# Patient Record
Sex: MALE | Race: BLACK OR AFRICAN AMERICAN
[De-identification: names, ages, dates, MRNs, and addresses within clinical notes are randomized per-mention and may not be internally consistent; named-entity substitution may affect disease eponyms.]

---

## 2021-08-17 ENCOUNTER — HOSPITAL ENCOUNTER (EMERGENCY)
Age: 36
Discharge: HOME | End: 2021-08-17
Payer: COMMERCIAL

## 2021-08-17 VITALS
TEMPERATURE: 98.24 F | DIASTOLIC BLOOD PRESSURE: 104 MMHG | OXYGEN SATURATION: 99 % | RESPIRATION RATE: 18 BRPM | SYSTOLIC BLOOD PRESSURE: 167 MMHG | HEART RATE: 76 BPM
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
## 2021-08-17 DIAGNOSIS — M54.9: Primary | ICD-10-CM

## 2021-08-17 DIAGNOSIS — R10.9: ICD-10-CM

## 2021-08-17 DIAGNOSIS — R11.0: ICD-10-CM

## 2021-08-17 DIAGNOSIS — Z20.822: ICD-10-CM

## 2021-08-17 PROCEDURE — 81015 MICROSCOPIC EXAM OF URINE: CPT

## 2021-08-17 PROCEDURE — 87635 SARS-COV-2 COVID-19 AMP PRB: CPT

## 2021-08-17 PROCEDURE — 81003 URINALYSIS AUTO W/O SCOPE: CPT

## 2021-08-17 PROCEDURE — 99283 EMERGENCY DEPT VISIT LOW MDM: CPT

## 2023-01-13 ENCOUNTER — HOSPITAL ENCOUNTER (OUTPATIENT)
Dept: HOSPITAL 76 - DI | Age: 38
Discharge: HOME | End: 2023-01-13
Attending: PHYSICIAN ASSISTANT
Payer: COMMERCIAL

## 2023-01-13 DIAGNOSIS — S90.112A: Primary | ICD-10-CM

## 2023-01-13 NOTE — XRAY REPORT
PROCEDURE:  Foot 3 View LT

 

INDICATIONS:  CONTUSION OF LEFT GREAT TOE WITHOUT DAMAGE TO NAIL

 

TECHNIQUE:  3 views of the foot were acquired.  

 

COMPARISON:  None

 

FINDINGS:  

 

Bones:  No fractures or dislocations.  No suspicious bony lesions.  

 

Soft tissues:  No tibiotalar joint effusion.  Achilles tendon appears normal.  

 

IMPRESSION:  

No acute osseous abnormality.

 

Reviewed by: Adeel De La Cruz MD on 1/13/2023 10:42 AM PST

Approved by: Adeel De La Cruz MD on 1/13/2023 10:42 AM [facility]

 

 

Station ID:  [id number]

## 2023-12-13 ENCOUNTER — HOSPITAL ENCOUNTER (OUTPATIENT)
Age: 38
End: 2023-12-13
Payer: COMMERCIAL

## 2023-12-13 DIAGNOSIS — I37.1: Primary | ICD-10-CM

## 2023-12-13 DIAGNOSIS — I10: ICD-10-CM

## 2023-12-13 PROCEDURE — 93306 TTE W/DOPPLER COMPLETE: CPT
